# Patient Record
Sex: FEMALE | URBAN - METROPOLITAN AREA
[De-identification: names, ages, dates, MRNs, and addresses within clinical notes are randomized per-mention and may not be internally consistent; named-entity substitution may affect disease eponyms.]

---

## 2021-11-10 ENCOUNTER — APPOINTMENT (OUTPATIENT)
Dept: URBAN - METROPOLITAN AREA CLINIC 265 | Age: 26
Setting detail: DERMATOLOGY
End: 2022-01-03

## 2021-11-10 DIAGNOSIS — L81.1 CHLOASMA: ICD-10-CM

## 2021-11-10 PROCEDURE — OTHER ADDITIONAL NOTES: OTHER

## 2021-11-10 NOTE — PROCEDURE: ADDITIONAL NOTES
Detail Level: Simple
Additional Notes: Purchased JM luminate and Daqi. Sunscreen and cerave and la madden-posay and wash samples given. \\nRTA 12/1 VI peel.
Render Risk Assessment In Note?: no

## 2021-12-01 ENCOUNTER — APPOINTMENT (OUTPATIENT)
Dept: URBAN - METROPOLITAN AREA CLINIC 265 | Age: 26
Setting detail: DERMATOLOGY
End: 2022-01-03

## 2021-12-01 DIAGNOSIS — L81.1 CHLOASMA: ICD-10-CM

## 2021-12-01 PROCEDURE — OTHER VI PEEL: OTHER

## 2021-12-01 PROCEDURE — OTHER ADDITIONAL NOTES: OTHER

## 2021-12-01 ASSESSMENT — LOCATION SIMPLE DESCRIPTION DERM
LOCATION SIMPLE: RIGHT CHEEK
LOCATION SIMPLE: LEFT CHEEK
LOCATION SIMPLE: UPPER LIP
LOCATION SIMPLE: RIGHT FOREHEAD
LOCATION SIMPLE: CHIN
LOCATION SIMPLE: NOSE

## 2021-12-01 ASSESSMENT — LOCATION ZONE DERM
LOCATION ZONE: FACE
LOCATION ZONE: LIP
LOCATION ZONE: NOSE

## 2021-12-01 ASSESSMENT — LOCATION DETAILED DESCRIPTION DERM
LOCATION DETAILED: NASAL DORSUM
LOCATION DETAILED: RIGHT MEDIAL FOREHEAD
LOCATION DETAILED: RIGHT CHIN
LOCATION DETAILED: PHILTRUM
LOCATION DETAILED: RIGHT CENTRAL MALAR CHEEK
LOCATION DETAILED: LEFT INFERIOR CENTRAL MALAR CHEEK

## 2021-12-01 NOTE — PROCEDURE: ADDITIONAL NOTES
Additional Notes: One pass on eye lids.  Vasoline around nasal piercings.
Detail Level: Simple
Render Risk Assessment In Note?: no

## 2024-10-14 NOTE — PROCEDURE: VI PEEL
Detail Level: Zone
Patient given depo on left deltoid on 10/14/24    NDC# 6699-371-79  LOT# [id number]  EXP# [id number]  
Price (Use Numbers Only, No Special Characters Or $): 300.00
Chemical Peel: VI Peel
Prep: The treated area was degreased with pre-peel cleanserx2
Post-Care Instructions: I reviewed with the patient in detail post-care instructions. Patient should avoid sun exposure and wear sun protection.
Post Peel Care: After the procedure, the patient was instructed not to wash the treated area for 4 hours or manually remove dead skin when the peeling process starts. Patient may use OTC hydrocortisone cream for itching.  Patient instructed to use the provided Retin-A wipes on the treated area on the 1st and 2nd nights. Purify plus
Consent: Prior to the procedure, written consent was obtained and risks were reviewed, including but not limited to: redness, peeling, blistering, pigmentary change, scarring, infection, and pain. Patient is aware multiple treatments may be necessary to achieve the desired outcome.
Treatment Number: 1